# Patient Record
Sex: MALE | Race: WHITE | URBAN - METROPOLITAN AREA
[De-identification: names, ages, dates, MRNs, and addresses within clinical notes are randomized per-mention and may not be internally consistent; named-entity substitution may affect disease eponyms.]

---

## 2024-04-05 ENCOUNTER — HOSPITAL ENCOUNTER (EMERGENCY)
Age: 54
Discharge: HOME | End: 2024-04-05
Attending: EMERGENCY MEDICINE

## 2024-04-05 VITALS
SYSTOLIC BLOOD PRESSURE: 150 MMHG | DIASTOLIC BLOOD PRESSURE: 98 MMHG | WEIGHT: 200 LBS | RESPIRATION RATE: 15 BRPM | HEIGHT: 69 IN | TEMPERATURE: 98.5 F | HEART RATE: 100 BPM | BODY MASS INDEX: 29.62 KG/M2 | OXYGEN SATURATION: 97 %

## 2024-04-05 DIAGNOSIS — S61.217A LACERATION OF LEFT LITTLE FINGER WITHOUT FOREIGN BODY WITHOUT DAMAGE TO NAIL, INITIAL ENCOUNTER: Primary | ICD-10-CM

## 2024-04-05 PROCEDURE — 99284 EMERGENCY DEPT VISIT MOD MDM: CPT

## 2024-04-05 PROCEDURE — 90715 TDAP VACCINE 7 YRS/> IM: CPT | Performed by: EMERGENCY MEDICINE

## 2024-04-05 PROCEDURE — 6360000002 HC RX W HCPCS: Performed by: EMERGENCY MEDICINE

## 2024-04-05 PROCEDURE — 90471 IMMUNIZATION ADMIN: CPT | Performed by: EMERGENCY MEDICINE

## 2024-04-05 RX ADMIN — TETANUS TOXOID, REDUCED DIPHTHERIA TOXOID AND ACELLULAR PERTUSSIS VACCINE, ADSORBED 0.5 ML: 5; 2.5; 8; 8; 2.5 SUSPENSION INTRAMUSCULAR at 13:10

## 2024-04-05 NOTE — DISCHARGE INSTRUCTIONS
Establish primary care physician Dr. Sarina Adams for further evaluation and suture removal in 10 to 14 days.  Call for an appointment  Keep the wound clean and dry  Take Tylenol alternate with Motrin for any pain aches and fevers  Return to the emergency department immediately any signs of infection pus drainage discharge swelling or any worsening symptoms.

## 2024-04-05 NOTE — ED PROVIDER NOTES
Emergency Department Encounter    Patient: Ameya Diaz  MRN: [id number]  : 1970  Date of Evaluation: 2024  ED Provider:  Camilla Stoll DO    Triage Chief Complaint:   Laceration    [place]:  Ameya Diaz is a 54 y.o. male with history of hypertension, anxiety that presents to the emergency department complaint of laceration to the left fifth digit that occurred last evening around 11 PM.  Patient states he was cutting onions and accidentally cut his left fifth digit.  Patient states he woke up today and it was still bleeding he decided come in for laceration repair.  Patient states unknown tetanus status.  Patient states he still able to bend and move the left fifth digit.  Patient states right-hand-dominant.  Patient denies any other injuries to the left hand or digits.  Patient here for evaluation.    ROS - see HPI, below listed is current ROS at time of my eval:  General:  No fevers, no chills, no weakness  Eyes:  No recent vison changes, no discharge  ENT:  No sore throat, no nasal congestion, no hearing changes  Cardiovascular:  No chest pain, no palpitations  Respiratory:  No shortness of breath, no cough, no wheezing  Gastrointestinal:  No pain, no nausea, no vomiting, no diarrhea  Musculoskeletal:  No muscle pain, no joint pain  Skin:  No rash, no pruritis, no easy bruising  Neurologic:  No speech problems, no headache, no extremity numbness, no extremity tingling, no extremity weakness  Psychiatric:  No anxiety  Genitourinary:  No dysuria, no hematuria  Endocrine:  No unexpected weight gain, no unexpected weight loss  Extremities: Laceration to the left fifth digit, no edema, no pain    Past Medical History:   Diagnosis Date    Hypertension      History reviewed. No pertinent surgical history.  History reviewed. No pertinent family history.  Social History     Socioeconomic History    Marital status:      Spouse name: Not on file    Number of children: Not on file    Years of